# Patient Record
Sex: FEMALE | Race: WHITE | NOT HISPANIC OR LATINO | Employment: FULL TIME | URBAN - METROPOLITAN AREA
[De-identification: names, ages, dates, MRNs, and addresses within clinical notes are randomized per-mention and may not be internally consistent; named-entity substitution may affect disease eponyms.]

---

## 2019-10-10 ENCOUNTER — OFFICE VISIT (OUTPATIENT)
Dept: URGENT CARE | Facility: URGENT CARE | Age: 39
End: 2019-10-10
Payer: COMMERCIAL

## 2019-10-10 VITALS
BODY MASS INDEX: 24.8 KG/M2 | TEMPERATURE: 98.5 F | OXYGEN SATURATION: 99 % | DIASTOLIC BLOOD PRESSURE: 78 MMHG | WEIGHT: 140 LBS | HEART RATE: 86 BPM | HEIGHT: 63 IN | SYSTOLIC BLOOD PRESSURE: 113 MMHG

## 2019-10-10 DIAGNOSIS — R05.8 PRODUCTIVE COUGH: ICD-10-CM

## 2019-10-10 DIAGNOSIS — R07.89 CHEST TIGHTNESS: ICD-10-CM

## 2019-10-10 DIAGNOSIS — R09.89 CHEST CONGESTION: Primary | ICD-10-CM

## 2019-10-10 DIAGNOSIS — R05.9 COUGH: ICD-10-CM

## 2019-10-10 PROCEDURE — 99204 OFFICE O/P NEW MOD 45 MIN: CPT | Performed by: PHYSICIAN ASSISTANT

## 2019-10-10 RX ORDER — ALBUTEROL SULFATE 90 UG/1
2 AEROSOL, METERED RESPIRATORY (INHALATION) EVERY 6 HOURS
Qty: 1 INHALER | Refills: 0 | Status: SHIPPED | OUTPATIENT
Start: 2019-10-10

## 2019-10-10 RX ORDER — AZITHROMYCIN 250 MG/1
TABLET, FILM COATED ORAL
Qty: 6 TABLET | Refills: 0 | Status: SHIPPED | OUTPATIENT
Start: 2019-10-10

## 2019-10-10 RX ORDER — CODEINE PHOSPHATE AND GUAIFENESIN 10; 100 MG/5ML; MG/5ML
5-10 SOLUTION ORAL
Qty: 120 ML | Refills: 0 | Status: SHIPPED | OUTPATIENT
Start: 2019-10-10

## 2019-10-10 ASSESSMENT — MIFFLIN-ST. JEOR: SCORE: 1285.2

## 2019-10-11 NOTE — PROGRESS NOTES
"SUBJECTIVE:   Zonia Vargas is a 39 year old female presenting with a chief complaint of chest congestion, productive cough and coughing.  Onset of symptoms was 1 week(s) ago.  Course of illness is same.    Severity moderate  Current and Associated symptoms: chest congestion, coughing, stuffy nose  Treatment measures tried include OTC Cough med.  Predisposing factors include recent illness .    PMH  Allergies  Lactation disorder    Allergies   Allergen Reactions     Blood-Group Specific Substance Other (See Comments)     Patient has a Probable Passive Anti-D antibody.Blood Product orders may be delayed.  Draw one red top and two purple top tubes for ALL Type and Screen/ Type and Crossmatch orders.     Penicillins Hives     Family Hx  Allergies  HTN    Social History     Tobacco Use     Smoking status: Never Smoker     Smokeless tobacco: Never Used   Substance Use Topics     Alcohol use: Not on file       ROS:  CONSTITUTIONAL:NEGATIVE for fever, chills, change in weight  INTEGUMENTARY/SKIN: NEGATIVE for worrisome rashes, moles or lesions  EYES: NEGATIVE for vision changes or irritation  ENT/MOUTH: POSITIVE for runny nose  RESP:POSITIVE for cough-productive and wheezing  CV: NEGATIVE for chest pain, palpitations or peripheral edema  GI: NEGATIVE for nausea, abdominal pain, heartburn, or change in bowel habits  : negative for and dysuria  MUSCULOSKELETAL: NEGATIVE for significant arthralgias or myalgia  NEURO: NEGATIVE for weakness, dizziness or paresthesias    OBJECTIVE  :/78   Pulse 86   Temp 98.5  F (36.9  C) (Oral)   Ht 1.594 m (5' 2.75\")   Wt 63.5 kg (140 lb)   SpO2 99%   BMI 25.00 kg/m    GENERAL APPEARANCE: healthy, alert and no distress  EYES: EOMI,  PERRL, conjunctiva clear  HENT: ear canals and TM's normal.  Nose and mouth without ulcers, erythema or lesions  NECK: supple, nontender, no lymphadenopathy  RESP: lungs clear to auscultation - no rales, rhonchi or wheezes  CV: regular rates and " rhythm, normal S1 S2, no murmur noted  ABDOMEN:  soft, nontender, no HSM or masses and bowel sounds normal  VASC: NEGATIVE for cool extremities  NEURO: Normal strength and tone, sensory exam grossly normal,  normal speech and mentation  SKIN: no suspicious lesions or rashes    ASSESSMENT/PLAN      ICD-10-CM    1. Chest congestion R09.89 azithromycin (ZITHROMAX) 250 MG tablet   2. Chest tightness R07.89 albuterol (PROVENTIL HFA) 108 (90 Base) MCG/ACT inhaler   3. Cough R05 guaiFENesin-codeine (ROBITUSSIN AC) 100-10 MG/5ML solution   4. Productive cough R05 azithromycin (ZITHROMAX) 250 MG tablet     Orders Placed This Encounter     azithromycin (ZITHROMAX) 250 MG tablet     albuterol (PROVENTIL HFA) 108 (90 Base) MCG/ACT inhaler     guaiFENesin-codeine (ROBITUSSIN AC) 100-10 MG/5ML solution       Fluids, rest  proventil for wheezing  zpak for chest congestion, productive cough  Robitussin ac for coughing  Fluids, rest  Follow up with PCP as needed  See orders in Epic

## 2023-06-13 ENCOUNTER — HOSPITAL ENCOUNTER (EMERGENCY)
Facility: CLINIC | Age: 43
Discharge: HOME | End: 2023-06-13
Attending: EMERGENCY MEDICINE | Admitting: EMERGENCY MEDICINE
Payer: COMMERCIAL

## 2023-06-13 ENCOUNTER — APPOINTMENT (OUTPATIENT)
Dept: GENERAL RADIOLOGY | Facility: CLINIC | Age: 43
End: 2023-06-13
Attending: EMERGENCY MEDICINE
Payer: COMMERCIAL

## 2023-06-13 VITALS
TEMPERATURE: 97.3 F | RESPIRATION RATE: 16 BRPM | OXYGEN SATURATION: 100 % | SYSTOLIC BLOOD PRESSURE: 127 MMHG | DIASTOLIC BLOOD PRESSURE: 73 MMHG | HEART RATE: 96 BPM

## 2023-06-13 DIAGNOSIS — S93.402A SPRAIN OF LEFT ANKLE, UNSPECIFIED LIGAMENT, INITIAL ENCOUNTER: ICD-10-CM

## 2023-06-13 PROCEDURE — 29515 APPLICATION SHORT LEG SPLINT: CPT | Mod: LT

## 2023-06-13 PROCEDURE — 99284 EMERGENCY DEPT VISIT MOD MDM: CPT

## 2023-06-13 PROCEDURE — 73610 X-RAY EXAM OF ANKLE: CPT | Mod: LT

## 2023-06-14 NOTE — ED PROVIDER NOTES
History     Chief Complaint:  Ankle Pain       The history is provided by the patient.      Zonia Vargas is a 42 year old female of who presents to the ED for evaluation of left ankle pain. Patient reports she was racing with her children back to their truck. Patient did not notice a metal object on ground, stepped on it, and rolled her left foot inwards Patient feels pain in her left ankle. Reports taking two Advil before arrival to ED. NO numbness I nthe foot. No other injuries.     Independent Historian:   None - Patient Only      Medications:    Albuterol inhaler  Colace  Ferrous sulfate  Ibuprofen  Percocet  Tylenol    Past Medical History:    Macrosomia  Overweight    Past Surgical History:     section, low transverse  Cyst removal  [place] teeth extraction    Physical Exam     Patient Vitals for the past 24 hrs:   BP Temp Temp src Pulse Resp SpO2   23 2141 127/73 97.3  F (36.3  C) Temporal 96 16 100 %        Physical Exam  VS: Reviewed per above  HENT: Mucous membranes moist  EYES: sclera anicteric  CV: Rate as noted,  regular rhythm. Intact left dp pulse.   RESP: Effort normal.   NEURO: Alert, moving all extremities  MSK: No deformity of the extremities. ttp lateral left ankle. No left foot ttp.   SKIN: Warm and dry    Emergency Department Course   Imaging:  XR Ankle Left G/E 3 Views   Final Result   IMPRESSION: Normal joint spaces and alignment. No fracture. Moderate soft tissue swelling along the lateral aspect of the ankle. Small Achilles heel spur.         Report per radiology    Emergency Department Course & Assessments:       Interventions:  None    Assessments:  2210 I obtained the history and examined the patient as noted above      Consultations/Discussion of Management or Tests:  None        Social Determinants of Health affecting care:   None    Disposition:  The patient was discharged to home.     Impression & Plan    Medical Decision Making:  Patient presents to the ER for  evaluation of left lateral ankle pain and swelling after rolling her left ankle inwards prior to arrival.  Vital signs reassuring.  No signs of neurovascular compromise in the left lower extremity distally.  Injury appears to be limited to the ankle region.  Fortunately no fracture or dislocation on x-ray.  Suspect ankle sprain.  Removable ankle splint provided.  Patient has crutches at home.  Discussed pain control with ibuprofen and Tylenol.  If not improving in a few weeks, recommended orthopedic follow-up.  Return precautions discussed prior to discharge.    Diagnosis:    ICD-10-CM    1. Sprain of left ankle, unspecified ligament, initial encounter  S93.402A Ankle/Foot Bracing Supplies DME Air Ankle Stirrup Brace; Left         Scribe Disclosure:  I, Lv Cortez, am serving as a scribe at 10:30 PM on 6/13/2023 to document services personally performed by Eron Aaron MD based on my observations and the provider's statements to me.     Scribe Disclosure:  IEmelina, am serving as a scribe at 10:38 PM on 6/13/2023 to document services personally performed by Eron Aaron MD based on my observations and the provider's statements to me.      6/13/2023   Eron Aaron MD Lindenbaum, Elan, MD  06/13/23 [id number]

## 2023-06-14 NOTE — ED TRIAGE NOTES
Patient was running and stepped on a pipe coming out of the ground. Rolling her ankle, patient felt a popping sensation.      Triage Assessment     Row Name 06/13/23 [id number]       Triage Assessment (Adult)    Airway WDL WDL       Respiratory WDL    Respiratory WDL WDL       Skin Circulation/Temperature WDL    Skin Circulation/Temperature WDL X  swelling to left ankle       Cardiac WDL    Cardiac WDL WDL       Peripheral/Neurovascular WDL    Peripheral Neurovascular WDL WDL       Cognitive/Neuro/Behavioral WDL    Cognitive/Neuro/Behavioral WDL WDL